# Patient Record
(demographics unavailable — no encounter records)

---

## 2025-04-09 NOTE — HISTORY OF PRESENT ILLNESS
[FreeTextEntry1] : CHRISTY JOHNSON is a 83 year old male with a past medical history of diabetes, hypertension, dyslipidemia, CAD requiring multiple stents in the past, small ascending aortic aneurysm, history of cholecystectomy for gallstones, history of colonic cancer status post colectomy in 2018 ; s/p  chemotherapy for recurrence .   Now he has been planned for colonoscopy and MRI of the abdomen; so far he does not recurrent disease as per patient..  He is very active, walks about 4 miles every single day.He  denies chest pain, pressure, palpitations, unusual shortness of breath, orthopnea, LE edema, lightheadedness, dizziness, near syncope or syncope.  Recently blood work confirm CKD, he is on Farxiga  March 8, 2023    Lexiscan myocardial perfusion scan confirmed apical and inferoapical infarct without any vania-infarct ischemia  6/8/21  Echo - Apical Akinesis with normal LVEF. EF 50-55%.Mild MR. Mild to mod AR. Aortic root 4.6 cm, ascneding aorta 4.4 cm, mildly dilated LA. Mild segmental LV systolic dysfunction with akinetic apex, mild DD< Mild TR, minimal FL, pericardial fat pad noted. Compared with echo 4/15/19, no significant changes noted.  [facility] 2017: BP 8 min. No evidence of ischemia by EKG. There are medium sized, moderate defects in apical, apical inferior walls that are fixed, suggestive of infarct. EF 52% with akinetic apex and hypokinetic inferior. No sig changes when compared to 9/2013.  His CAD risk factors include his age, male sex, diabetes, hypertension, and dyslipidemia.  Denies smoking or significant family history of premature coronary artery disease.  ABD u/s 2017: No AAA. Mild plaque. Quit smoking in the 1960's.

## 2025-04-09 NOTE — PHYSICAL EXAM
[Well Developed] : well developed [Well Nourished] : well nourished [No Acute Distress] : no acute distress [Normal Venous Pressure] : normal venous pressure [No Carotid Bruit] : no carotid bruit [Normal S1, S2] : normal S1, S2 [No Murmur] : no murmur [No Rub] : no rub [No Gallop] : no gallop [Clear Lung Fields] : clear lung fields [Good Air Entry] : good air entry [No Respiratory Distress] : no respiratory distress  [Soft] : abdomen soft [Non Tender] : non-tender [No Masses/organomegaly] : no masses/organomegaly [Normal Bowel Sounds] : normal bowel sounds [Normal Gait] : normal gait [No Edema] : no edema [No Cyanosis] : no cyanosis [No Clubbing] : no clubbing [No Varicosities] : no varicosities [No Rash] : no rash [No Skin Lesions] : no skin lesions [Moves all extremities] : moves all extremities [No Focal Deficits] : no focal deficits [Normal Speech] : normal speech [Alert and Oriented] : alert and oriented [Normal memory] : normal memory [General Appearance - Well Developed] : well developed [Normal Appearance] : normal appearance [Well Groomed] : well groomed [General Appearance - Well Nourished] : well nourished [No Deformities] : no deformities [General Appearance - In No Acute Distress] : no acute distress [Normal Conjunctiva] : the conjunctiva exhibited no abnormalities [Eyelids - No Xanthelasma] : the eyelids demonstrated no xanthelasmas [Normal Oral Mucosa] : normal oral mucosa [No Oral Pallor] : no oral pallor [No Oral Cyanosis] : no oral cyanosis [Normal Jugular Venous A Waves Present] : normal jugular venous A waves present [Normal Jugular Venous V Waves Present] : normal jugular venous V waves present [No Jugular Venous [place] A Waves] : no jugular venous [place] A waves [Respiration, Rhythm And Depth] : normal respiratory rhythm and effort [Exaggerated Use Of Accessory Muscles For Inspiration] : no accessory muscle use [Auscultation Breath Sounds / Voice Sounds] : lungs were clear to auscultation bilaterally [Heart Rate And Rhythm] : heart rate and rhythm were normal [Heart Sounds] : normal S1 and S2 [Murmurs] : no murmurs present [Abdomen Soft] : soft [Abdomen Tenderness] : non-tender [Abdomen Mass (___ Cm)] : no abdominal mass palpated [Abnormal Walk] : normal gait [Gait - Sufficient For Exercise Testing] : the gait was sufficient for exercise testing [Nail Clubbing] : no clubbing of the fingernails [Cyanosis, Localized] : no localized cyanosis [Petechial Hemorrhages (___cm)] : no petechial hemorrhages [] : no ischemic changes [Oriented To Time, Place, And Person] : oriented to person, place, and time [Affect] : the affect was normal [Mood] : the mood was normal [No Anxiety] : not feeling anxious

## 2025-04-09 NOTE — ASSESSMENT
[FreeTextEntry1] : History of colonic cancer status post colectomy , s/p radiation as well as chemotherapy.  Now he has been planned for colonoscopy.  Cardiac wise he is maximized for the procedure.  Please allow Plavix to the procedure.  CAD status post MI status post stenting. Continue ASA, Plavix, statin, betablocker.  Lexiscan marker perfusion scan to see inducible ischemia.  Echo for LVEF.  And ascending aortic aneurysm size.  Diabetes, well controlled.  Continue current medication.  Diabetes diet has been discussed with him.  Long-term goals of blood pressure less than 130/80, A1c less than 7, and LDL less than 70.  Hypertension -low-salt diet, continue medications.  Dyslipidemia.  Low-cholesterol diet.  He knows what foods should be avoided.  Continue current medication.  Ascending aortic aneurysm.  Repeat echo confirmed aortic aneurysm size 4.6 cms. BP control and surveillance monitoring.  Repeat echocardiogram for aortic aneurysm size in 6 months followed by office visit.  Ongoing f/u with PCP.  Preop for for colonoscopy-clinically has no evidence of ACS or CHF; last echo confirmed normal LVEF ; myocardial perfusion scan showed only infarct ; overall cardiac wise he seems maximized for the planned procedure.  Perioperative close monitoring of vital.  Postop DVT prophylaxis.  Please call me if I can assist you further.  Minimize Plavix off duration. Please call me if I can assist you further.  Risk factor modification has been discussed with the patient at great length; he'll be reevaluated by me in 6 months after echocardiogram.  Metoprolol refill has been given to him.

## 2025-06-24 NOTE — HISTORY OF PRESENT ILLNESS
[FreeTextEntry1] : As per Dr Tolentino last note 82-year-old patient presented today for the follow-up.   Last visit I saw him because of lower urinary tract symptoms and elevated creatinine Patient had regular PSA test until the age of 80.  All of his PSA were normal. Because of lower urinary tract symptoms caused by enlarged prostate to be started treatment with tamsulosin 0.4 grams. Today patient reported full satisfaction with the results of the treatment  6/24/25 Pt comes in follow up. Feels flomax is working well.